# Patient Record
Sex: FEMALE | Race: WHITE
[De-identification: names, ages, dates, MRNs, and addresses within clinical notes are randomized per-mention and may not be internally consistent; named-entity substitution may affect disease eponyms.]

---

## 2019-03-08 ENCOUNTER — HOSPITAL ENCOUNTER (INPATIENT)
Dept: HOSPITAL 41 - JD.OBCHECK | Age: 29
LOS: 2 days | Discharge: HOME | End: 2019-03-10
Attending: OBSTETRICS & GYNECOLOGY | Admitting: OBSTETRICS & GYNECOLOGY
Payer: COMMERCIAL

## 2019-03-08 DIAGNOSIS — Z88.0: ICD-10-CM

## 2019-03-08 DIAGNOSIS — Z3A.39: ICD-10-CM

## 2019-03-08 DIAGNOSIS — O36.8130: Primary | ICD-10-CM

## 2019-03-08 DIAGNOSIS — Z88.1: ICD-10-CM

## 2019-03-08 DIAGNOSIS — Z88.2: ICD-10-CM

## 2019-03-08 DIAGNOSIS — E06.3: ICD-10-CM

## 2019-03-08 DIAGNOSIS — K21.9: ICD-10-CM

## 2019-03-08 NOTE — PCM.PREANE
Preanesthetic Assessment





- Anesthesia/Transfusion/Family Hx


Anesthesia History: Prior Anesthesia Without Reaction


Type of Anesthesia Reaction: Excessive Nausea/Vomiting


Family History of Anesthesia Reaction: No


Transfusion History: No Prior Transfusion(s)


Intubation History: Unknown





- Review of Systems


General: No Symptoms


Pulmonary: No Symptoms


Cardiovascular: No Symptoms


Gastrointestinal: No Symptoms (GERD with pregnancy), Diarrhea


Neurological: No Symptoms (motion sickness)


Other: Reports: None (Hashimoto's disease), Sinus Problem (seasonal allergies)





- Physical Assessment


NPO Status Date: 03/08/19


NPO Status Time: 12:30


Pulse: 75


O2 Sat by Pulse Oximetry: 99


Respiratory Rate: 14


Blood Pressure: 118/79


Temperature: 36.4 C


Vital Signs: 





 Last Vital Signs











Temp  36.4 C   03/08/19 10:01


 


Pulse  75   03/08/19 10:01


 


Resp  14   03/08/19 10:01


 


BP  118/74   03/08/19 10:01


 


Pulse Ox      











Height: 1.63 m


Weight: 83.915 kg


ASA Class: 2


Mental Status: Alert & Oriented x3


Airway Class: Mallampati = 2


Dentition: Reports: Normal Dentition, Caries


Thyro-Mental Finger Breadths: 3


Mouth Opening Finger Breadths: 3


ROM/Head Extension: Full


Lungs: Clear to Auscultation, Normal Respiratory Effort


Cardiovascular: Regular Rate, Regular Rhythm, No Murmurs





- Lab


Values: 





 Laboratory Last Values











WBC  8.96 K/mm3 (3.98-10.04)   03/08/19  10:40    


 


RBC  4.29 M/mm3 (3.98-5.22)   03/08/19  10:40    


 


Hgb  13.1 gm/L (11.2-15.7)   03/08/19  10:40    


 


Hct  38.8 % (34.1-44.9)   03/08/19  10:40    


 


MCV  90.4 fl (79.4-94.8)   03/08/19  10:40    


 


MCH  30.5 pg (25.6-32.2)   03/08/19  10:40    


 


MCHC  33.8 g/dl (32.2-35.5)   03/08/19  10:40    


 


RDW Std Deviation  41.9 fL (36.4-46.3)   03/08/19  10:40    


 


Plt Count  248 K/mm3 (182-369)   03/08/19  10:40    


 


MPV  9.8 fl (9.4-12.3)   03/08/19  10:40    


 


Neut % (Auto)  68.7 % (34.0-71.1)   03/08/19  10:40    


 


Lymph % (Auto)  20.9 % (19.3-51.7)   03/08/19  10:40    


 


Mono % (Auto)  8.3 % (4.7-12.5)   03/08/19  10:40    


 


Eos % (Auto)  1.6  (0.7-5.8)   03/08/19  10:40    


 


Baso % (Auto)  0.1 % (0.1-1.2)   03/08/19  10:40    


 


Neut # (Auto)  6.16 K/mm3 (1.56-6.13)  H  03/08/19  10:40    


 


Lymph # (Auto)  1.87 K/mm3 (1.18-3.74)   03/08/19  10:40    


 


Mono # (Auto)  0.74 K/mm3 (0.24-0.36)  H  03/08/19  10:40    


 


Eos # (Auto)  0.14 K/mm3 (0.04-0.36)   03/08/19  10:40    


 


Baso # (Auto)  0.01 K/mm3 (0.01-0.08)   03/08/19  10:40    








Above labs reviewed and noted and within acceptable ranges to proceed with 

epidural if desired.





- Allergies


Allergies/Adverse Reactions: 


 Allergies











Allergy/AdvReac Type Severity Reaction Status Date / Time


 


amoxicillin [Amoxicillin] Allergy  Blurred Verified 07/20/14 18:13





   Vision  


 


cefaclor [From Ceclor] Allergy  Blurred Verified 07/20/14 18:13





   Vision  


 


Sulfa (Sulfonamide Allergy  Blurred Verified 07/20/14 18:13





Antibiotics)   Vision  














- Anesthesia Plan


Pre-Op Medication Ordered: None





- Acknowledgements


Anesthesia Type Planned: Epidural


Pt an Appropriate Candidate for the Planned Anesthesia: Yes


Alternatives and Risks of Anesthesia Discussed w Pt/Guardian: Yes


Pt/Guardian Understands and Agrees with Anesthesia Plan: Yes





PreAnesthesia Questionnaire





- Past Health History


Medical/Surgical History: Denies Medical/Surgical History





- CURRENT (IN HOUSE) MEDS


Current Meds: 





 Current Medications





Lactated Ringer's (Ringers, Lactated)  1,000 mls @ 100 mls/hr IV ASDIRECTED DAVID


   Last Admin: 03/08/19 11:18 Dose:  100 mls/hr


Oxytocin/Lactated Ringer's (Pitocin In Lr 10 Units/1,000 Ml)  10 unit in 1,000 

mls @ 12 mls/hr IV TITRATE DAVID; Protocol


   Last Admin: 03/08/19 11:19 Dose:  2 munits/min, 12 mls/hr


Oxytocin/Lactated Ringer's (Pitocin In Lr 10 Units/1,000 Ml)  10 unit in 1,000 

mls @ 500 mls/hr IV .CONTINUOUS DAVID


Vancomycin HCl 1 gm/ Sodium (Chloride)  250 mls @ 250 mls/hr IV Q12H [facility]


   Last Admin: 03/08/19 11:31 Dose:  250 mls/hr


Nalbuphine HCl (Nubain)  10 mg IVPUSH Q2H PRN


   PRN Reason: pain


Ondansetron HCl (Zofran)  4 mg IVPUSH Q4H PRN


   PRN Reason: Nausea/Vomiting


Sodium Chloride (Saline Flush)  10 ml FLUSH ASDIRECTED PRN


   PRN Reason: Keep Vein Open





Discontinued Medications





Vancomycin HCl 1 gm/ Sodium (Chloride)  250 mls @ 250 mls/hr IV Q12H DAVID

## 2019-03-09 PROCEDURE — 3E0R3BZ INTRODUCTION OF ANESTHETIC AGENT INTO SPINAL CANAL, PERCUTANEOUS APPROACH: ICD-10-PCS

## 2019-03-09 PROCEDURE — 10907ZC DRAINAGE OF AMNIOTIC FLUID, THERAPEUTIC FROM PRODUCTS OF CONCEPTION, VIA NATURAL OR ARTIFICIAL OPENING: ICD-10-PCS | Performed by: OBSTETRICS & GYNECOLOGY

## 2019-03-09 PROCEDURE — 00HU33Z INSERTION OF INFUSION DEVICE INTO SPINAL CANAL, PERCUTANEOUS APPROACH: ICD-10-PCS

## 2019-03-09 PROCEDURE — 0DQR0ZZ REPAIR ANAL SPHINCTER, OPEN APPROACH: ICD-10-PCS | Performed by: OBSTETRICS & GYNECOLOGY

## 2019-03-09 PROCEDURE — 6A550ZT PHERESIS OF CORD BLOOD STEM CELLS, SINGLE: ICD-10-PCS | Performed by: OBSTETRICS & GYNECOLOGY

## 2019-03-09 RX ADMIN — VITAMIN A, ASCORBIC ACID, CHOLECALCIFEROL, .ALPHA.-TOCOPHEROL ACETATE, DL-, THIAMINE MONONITRATE, RIBOFLAVIN, NIACINAMIDE, PYRIDOXINE HYDROCHLORIDE, FOLIC ACID, CYANOCOBALAMIN, CALCIUM CARBONATE, IRON, ZINC OXIDE, AND CUPRIC OXIDE SCH: 4000; 120; 400; 22; 1.84; 3; 20; 10; 1; 12; 200; 29; 25; 2 TABLET ORAL at 11:06

## 2019-03-09 NOTE — PCM.SN
- Free Text/Narrative


Note: 


    Delivery note:





Hetal is a 28-year-old  1 now para 1001 white female who was admitted 

on 3/8/2019 at 39-1/7 weeks gestational age with an BABATUNDE of 3/14/2019. She was 

admitted with reported decreased fetal activity. She was evaluated in clinic 

and had a nonreactive NST. Patient reported decreased activity one other time 

during the course of the week and evaluation showed a reactive NST. She was 

also noted to be greg every 1-3 minutes. There were mild in nature. 

Decision was made to admit patient and to proceed to effect delivery. She was 

started on Pitocin and then underwent artificial rupture membranes 

augmentation. Minimal clear fluid was noted at the time rupture membranes.





Patient proceeded to complete cervical dilation by approximately 0200 hrs. on 3/

2019. She had had an epidural placed for analgesia. Patient proceeded to push 

for approximately the next 3 hours. Brought the head down to about 0 station to 

+1 station. Fetal heart tones remained reassuring throughout labor course. 





Patient began to become fatigued and decision was made to proceed with vacuum 

extraction delivery. The vacuum extraction delivery, its alternatives, its 

procedure, risks, benefits, limitations and follow-up were all discussed with 

patient including potential injury to the baby and mom. She appeared to 

understand and wish to proceed. Vacuum extractor was placed and 0514 hours on 3/

2019 she delivered a viable, larsen, male infant named margarette Watt in a 

direct occiput posterior position. She had a third-degree laceration. There was 

terminal meconium noted after the baby delivered. Nuchal cord 1 was reduced 

over the baby's body. The umbilical cord appeared to be very thin. Baby weighed 

3010 g (6 pounds 10.2 ounces).





At the time of delivery and the cord was clamped 2 and was cut by the baby's 

father. The baby was placed in the warmer and was evaluated. Apgars were 7 and 

9. Cord blood was obtained. The third-degree laceration was repaired in routine 

fashion using 3-0 Monocryl reapproximating the anal sphincter and then closing 

the capsule over it. The remainder of the repair was consistent with second-

degree laceration. After repair of the laceration a rectal exam was done and 

there was no evidence of fourth degree laceration. Patient tolerated procedure 

well and the epidural placed for analgesia worked well for repair anesthesia. 

The placenta delivered at 0527 hrs. in a Shea presentation, was meconium-

stained, appeared intact and complete and was discarded per patient desire. The 

umbilical cord had 3 vessels.





Estimated blood loss was 100 mL. Condition: Good. Patient plans to breast-feed.

## 2019-03-10 RX ADMIN — VITAMIN A, ASCORBIC ACID, CHOLECALCIFEROL, .ALPHA.-TOCOPHEROL ACETATE, DL-, THIAMINE MONONITRATE, RIBOFLAVIN, NIACINAMIDE, PYRIDOXINE HYDROCHLORIDE, FOLIC ACID, CYANOCOBALAMIN, CALCIUM CARBONATE, IRON, ZINC OXIDE, AND CUPRIC OXIDE SCH: 4000; 120; 400; 22; 1.84; 3; 20; 10; 1; 12; 200; 29; 25; 2 TABLET ORAL at 12:18

## 2019-03-10 NOTE — PCM.DCSUM1
**Discharge Summary





- Hospital Course


Free Text/Narrative:: 








Hetal is a 28-year-old  1 now para 1001 white female who was admitted 

on 3/8/2019 at 39-1/7 weeks gestational age with an BABATUNDE of 3/14/2019. She was 

admitted with reported decreased fetal activity. She was evaluated in clinic 

and had a nonreactive NST. Patient reported decreased activity one other time 

during the course of the week and evaluation showed a reactive NST. She was 

also noted to be greg every 1-3 minutes. There were mild in nature. 

Decision was made to admit patient and to proceed to effect delivery. She was 

started on Pitocin and then underwent artificial rupture membranes 

augmentation. Minimal clear fluid was noted at the time rupture membranes.





Patient proceeded to complete cervical dilation by approximately 0200 hrs. on 3/

2019. She had had an epidural placed for analgesia. Patient proceeded to push 

for approximately the next 3 hours. Brought the head down to about 0 station to 

+1 station. Fetal heart tones remained reassuring throughout labor course. 





Patient began to become fatigued and decision was made to proceed with vacuum 

extraction delivery. The vacuum extraction delivery, its alternatives, its 

procedure, risks, benefits, limitations and follow-up were all discussed with 

patient including potential injury to the baby and mom. She appeared to 

understand and wish to proceed. Vacuum extractor was placed and 0514 hours on 3/

2019 she delivered a viable, larsen, male infant named margarette Watt in a 

direct occiput posterior position. She had a third-degree laceration. There was 

terminal meconium noted after the baby delivered. Nuchal cord 1 was reduced 

over the baby's body. The umbilical cord appeared to be very thin. Baby weighed 

3010 g (6 pounds 10.2 ounces).





At the time of delivery and the cord was clamped 2 and was cut by the baby's 

father. The baby was placed in the warmer and was evaluated. Apgars were 7 and 

9. Cord blood was obtained. The third-degree laceration was repaired in routine 

fashion using 3-0 Monocryl reapproximating the anal sphincter and then closing 

the capsule over it. The remainder of the repair was consistent with second-

degree laceration. After repair of the laceration a rectal exam was done and 

there was no evidence of fourth degree laceration. Patient tolerated procedure 

well and the epidural placed for analgesia worked well for repair anesthesia. 

The placenta delivered at 0527 hrs. in a Shea presentation, was meconium-

stained, appeared intact and complete and was discarded per patient desire. The 

umbilical cord had 3 vessels.Estimated blood loss was 100 mL.





Postpartum patient is done very well. She is breast-feeding without problems, 

she is ambulating well. She is started on Colace for stool softeners because of 

the third-degree perineal laceration. She is aware that she needs to keep her 

stool soft. She is desiring discharge home.


Diagnosis: Stroke: No





- Discharge Data


Discharge Date: 03/10/19


Discharge Disposition: Home, Self-Care 01


Condition: Good





- Patient Instructions


Diet: Regular Diet as Tolerated (Nursing diet with increased calories and 

calcium is recommended.)


Activity: As Tolerated (No intercourse or tampons until bleeding resolves)


Driving: May Drive Today


Showering/Bathing: May Shower (May take a bath)


Notify Provider of: Fever, Increased Pain, Swelling and Redness, Nausea and/or 

Vomiting





- Discharge Plan


Home Medications: 


 Home Meds





L.acidoph,Paracasei, B.lactis [Probiotic] 1 tab PO DAILY 19 [History]


CKI997/Iron Fumarate/FA/DSS [Prenatal 19 Tablet] 1 tab PO DAILY 19 [

History]


Acetaminophen [Tylenol] 650 mg PO Q4H PRN  tablet 03/10/19 [Rx]


Docusate Sodium [Colace] 100 mg PO BID  cap 03/10/19 [Rx]


Ibuprofen [Motrin] 600 mg PO Q4H PRN  tablet 03/10/19 [Rx]








Patient Handouts:  Home Care Instructions for Mom, Eating Plan for 

Breastfeeding Women





- Discharge Summary/Plan Comment


DC Time >30 min.: No


Discharge Summary/Plan Comment: 








Discharge instructions:





1. Discharge home





2. Diet, activity and follow-up discussed with patient. Recommend nursing diet 

with increased calories and calcium.





3. Precautions given concern increased pain, bleeding, temperature, signs/

symptoms of DVT/PE.





4. Medications per home medication was printed, discussed with and given to the 

patient.





5. Return to clinic-Dr. Cruz-[facility] in 3 weeks.





Diagnosis: Term pregnancy-delivered 





Condition: Good





- Patient Data


Vitals - Most Recent: 


 Last Vital Signs











Temp  36.4 C   03/10/19 08:18


 


Pulse  68   03/10/19 08:18


 


Resp  16   03/10/19 08:18


 


BP  102/57 L  03/10/19 08:18


 


Pulse Ox  98   03/10/19 08:18











Weight - Most Recent: 83.915 kg


I&O - Last 24 hours: 


 Intake & Output











 03/09/19 03/10/19 03/10/19





 21:59 06:59 14:59


 


Intake Total   360


 


Output Total   400


 


Balance   -40











Med Orders - Current: 


 Current Medications





Acetaminophen (Tylenol)  650 mg PO Q4H PRN


   PRN Reason: mild pain or fever


Benzocaine/Menthol (Dermoplast Pain Relief Spray)  0 gm TOP ASDIRECTED PRN


   PRN Reason: Perineal Comfort Measure


   Last Admin: 19 08:24 Dose:  1 can


Docusate Sodium (Colace)  100 mg PO BID [facility]


   Last Admin: 03/10/19 08:39 Dose:  100 mg


Emollient Ointment (Lansinoh Hpa)  0 gm TOP ASDIRECTED PRN


   PRN Reason: Sore Nipples


Ibuprofen (Motrin)  600 mg PO Q4H PRN


   PRN Reason: Mild pain or fever


   Last Admin: 03/10/19 05:03 Dose:  600 mg


Prenat Multivit//Iron/Folic Ac (Prenatal Plus Iron)  1 each PO DAILY [facility]


   Last Admin: 19 11:06 Dose:  Not Given


Witch Hazel (Tucks)  1 pad TOP ASDIRECTED PRN


   PRN Reason: Hemorrhoid pain


   Last Admin: 19 08:24 Dose:  1 tub





Discontinued Medications





Bupivacaine HCl (Sensorcaine-Mpf 0.25%)  10 ml .ROUTE .STK-MED ONE


   Stop: 19 04:01


Ephedrine Sulfate (Ephedrine Sulfate)  5 mg IVPUSH ASDIRECTED PRN


   PRN Reason: Hypotension


Fentanyl (Sublimaze)  100 mcg EPIDUR Q3H PRN


   PRN Reason: Pain


   Last Admin: 19 20:28 Dose:  100 mcg


Fentanyl/Bupivacaine HCl (Fentanyl-Bupiv-Ns 2 Mcg/Ml-0.125%)  100 ml EP 

ASDIRECTED [facility]


   Last Admin: 19 20:28 Dose:  100 ml


Lactated Ringer's (Ringers, Lactated)  1,000 mls @ 100 mls/hr IV ASDIRECTED DAVID


   Last Admin: 19 01:20 Dose:  100 mls/hr


Oxytocin/Lactated Ringer's (Pitocin In Lr 10 Units/1,000 Ml)  10 unit in 1,000 

mls @ 12 mls/hr IV TITRATE DAVID; Protocol


   Last Titration: 19 04:55 Dose:  4 munits/min, 24 mls/hr


Oxytocin/Lactated Ringer's (Pitocin In Lr 10 Units/1,000 Ml)  10 unit in 1,000 

mls @ 500 mls/hr IV .CONTINUOUS DAVID


   Last Admin: 19 05:15 Dose:  500 mls/hr


Vancomycin HCl 1 gm/ Sodium (Chloride)  250 mls @ 250 mls/hr IV Q12H DAVID


Vancomycin HCl 1 gm/ Sodium (Chloride)  250 mls @ 250 mls/hr IV Q12H DAVID


   Last Admin: 19 23:07 Dose:  250 mls/hr


Lidocaine/Epinephrine (Xylocaine-Mpf 1.5% W/Epinephrine 1:200,000)  5 ml .ROUTE 

.STK-MED ONE


   Stop: 19 04:01


Nalbuphine HCl (Nubain)  10 mg IVPUSH Q2H PRN


   PRN Reason: pain


Ondansetron HCl (Zofran)  4 mg IVPUSH Q4H PRN


   PRN Reason: Nausea/Vomiting


   Last Admin: 19 23:00 Dose:  4 mg


Ondansetron HCl (Zofran)  4 mg IVPUSH ONETIME PRN


   PRN Reason: Nausea/Vomiting


   Stop: 19 20:00


Scopolamine (Transderm-Scop)  1.5 mg TRDERM ONETIME PRN


   PRN Reason: PONV


Sodium Chloride (Saline Flush)  10 ml FLUSH ASDIRECTED PRN


   PRN Reason: Keep Vein Open

## 2019-03-11 NOTE — HP
DATE OF ADMISSION:  2019

 

ADMISSION DIAGNOSES:

39-1/7th week intrauterine pregnancy, decreased fetal activity, early labor.

 

HISTORY OF PRESENT ILLNESS:

The patient is a 28-year-old,  1, para 0, white female, who has an BABATUNDE of

2019, placing her at 39-1/7th weeks' gestational age.  Her BABATUNDE is

determined by certain last menstrual period which started on 2018 and is

supported by 3 ultrasounds during the course of the pregnancy taken placed on

2018, 10/02/2018, and 2018.  She was seen in clinic today prior to

admission to Labor and Delivery and found to have decreased fetal activity.  NST

was not reactive at that time, but the patient was noted to have contractions

every 3 minutes and did not have any deceleration, therefore, had a negative

contraction stress test.  She is sent to Labor and Delivery with reported

decreased fetal activity, but with what appears to be early labor.  Her cervix

on last evaluation in clinic was approximately 1 to 2 cm, very posterior,

approximately 60% effaced and soft.  The baby is in vertex presentation.

 

PRENATAL COURSE:

The patient was seen for her first prenatal visit at 11 weeks.  Her weight gain

was from 168.8 pounds to 187 pounds for approximately a 19-pound weight gain.

Her fundal height growth was appropriate.  The patient has had some bacterial

vaginosis which was treated in pregnancy.  She declined genetic testing.  She is

group B strep positive, she has history of Hashimoto's disease.  She had her

Tdap vaccination administered on 2019.  She has had her flu shot in

2018.  MMR shows that she is rubella immune.  She has had her hepatitis

B vaccinations in .  She also had meningeal conical vaccinations on

10/10/2007.

 

LABORATORY DATA:

Shows her blood to be A positive with negative antibody screen.  First prenatal

hemoglobin was 12.9 g/dL.  Platelets were 324,000.  She is rubella immune.  RPR

is nonreactive.  Hepatitis B surface antigen and HIV assays were both negative.

Her GC and chlamydia were both negative.  Her TSH on 2018 was normal at

1.370.  Second trimester laboratory testing showed hemoglobin at 12.0 g/dL.  Her

platelet count was 292,000 and her diabetic screen test was 144.  Her RPR was

repeated on 2018 and was nonreactive.  Group B strep screen was positive.

Her hemoglobin A1c was 4.9, as the patient refused to do 3-hour GTT and

hemoglobin A1c was done instead.  This was within normal limits.

 

ALLERGIES:

1. Sulfa drugs which give her hives.

2. Bactrim tabs which give her hives.

3. Ceclor caps, which gave her hives.

4. Penicillin, including Augmentin, which gives her hives and nausea

    respectively.

5. The patient also has seasonal allergies.

 

PAST MEDICAL HISTORY:

Patient had abnormal Pap smear with resulted in a LEEP procedure in .

 

PAST SURGICAL HISTORY:

1. Tonsillectomy with adenoid ectopy.

2. Thyroglossal duct cyst removal.

 

FAMILY HISTORY:

Mother is alive and well as is her father.  One sister is alive and well.

Maternal grandfather's medical history uncertain.  Maternal grandmother had

thyroid issues and is on medication and is a smoker.  Paternal grandfather is

alive and well but is a smoker.  Paternal grandmother is alive, has allergies

and is a smoker.  There is no family history of cancer, bleeding or blood

clotting issues.  Mother does have some issues with anesthesia.

 

SOCIAL HISTORY:

The patient is .   is Tony Olvera.  The patient is an MRI tech

who works at [employer] in [place], [place].  She is a college

graduate.  She lives in [place], [place].  She does not use any

significant amounts of alcohol, drugs, or tobacco.

 

REVIEW OF SYSTEMS:

GENERAL:  The patient is having contractions, but is still very comfortable with

them.

HEENT, NECK, AND BACK:  Negative.

CARDIOVASCULAR:  No chest pain or exercise intolerance.

LUNGS:  No shortness of breath or infectious symptoms.

BREASTS:  Changes associated with pregnancy.  The patient does plan to breast

feed.

GI:  Negative.

:  Increased size of the uterus secondary to pregnancy.

EXTREMITIES:  Negative.

NEUROLOGICAL:  Negative.

 

PHYSICAL EXAMINATION:

VITAL SIGNS:  On last evaluation in clinic, her weight was 187.  Pregravid

weight was 168.8.  Her height is 5 feet 5 inches, blood pressure on last

evaluation was 118/70.  Fetal heart rate was 110 to 115 on fetal monitoring.

Her pregravid body mass index was 26.6.

GENERAL:  The patient is a well-developed, well-nourished, pleasant female,

stated age, in no acute distress.

SKIN:  Warm, dry, without lesions.

HEENT:  Within normal limits.

NECK:  Within normal limits.

BACK:  Within normal limits.

LUNGS:  Clear with good breath sounds in all lung fields.

BREAST:  Deferred at this time.

ABDOMEN:  Shows fundal height consistent with term pregnancy at 39 weeks.  Baby

in vertex presentation.

CERVIX:  As above.

EXTREMITIES AND NEUROLOGIC:  Trace edema, otherwise unremarkable.

 

ASSESSMENT:

1. 39- week intrauterine pregnancy, reported decreased fetal activity on

    2 occasions in the last week.  Non-reassuring NST but negative CST in

    clinic.  Heart rate between 110 and 120 on the average.

2. Probable early labor.  Patient greg every 3 minutes, but still mild.

3. Group B strep positive - first dose of antibiotics given.

4. The patient would like to do natural labor but is not totally against

    epidural.

5. RPR nonreactive.

6. Rubella titer shows immunity.

7. The patient is Rh-positive.

8. The patient plans to breastfeed.

 

PLAN:

1. Anticipate .

2. We will augment with Pitocin as indicated.

3. Group B strep prophylaxis per protocol.

4. Anticipate normal spontaneous vaginal delivery.

 

DD:  2019 13:50:34

DT:  2019 14:46:29  MMODAL

Job #:  [id number]

## 2022-02-07 NOTE — [id number]
Apply ice to area of tenderness in her back 30-40 minutes 3 times a day.  Take prednisone every morning.  You may take Naprosyn twice a day for pain with food.   Post Anesthesia Note





- EVALUATION WITHIN 48HRS OF ANESTHETIC


Vital Signs in Normal Range: Yes


Patient Participated in Evaluation: Yes


Respiratory Function Stable: Yes


Airway Patent: Yes


Cardiovascular Function Stable: Yes


Hydration Status Stable: Yes


Pain Control Satisfactory: Yes


Nausea and Vomiting Control Satisfactory: Yes


Mental Status Recovered: Yes